# Patient Record
Sex: MALE | Race: OTHER | Employment: FULL TIME | URBAN - METROPOLITAN AREA
[De-identification: names, ages, dates, MRNs, and addresses within clinical notes are randomized per-mention and may not be internally consistent; named-entity substitution may affect disease eponyms.]

---

## 2018-11-16 ENCOUNTER — HOSPITAL ENCOUNTER (OUTPATIENT)
Age: 43
Discharge: HOME | End: 2018-11-16
Payer: COMMERCIAL

## 2018-11-16 VITALS
DIASTOLIC BLOOD PRESSURE: 97 MMHG | RESPIRATION RATE: 21 BRPM | HEART RATE: 83 BPM | SYSTOLIC BLOOD PRESSURE: 145 MMHG | TEMPERATURE: 98 F | OXYGEN SATURATION: 97 %

## 2018-11-16 DIAGNOSIS — B30.9 ACUTE VIRAL CONJUNCTIVITIS OF LEFT EYE: Primary | ICD-10-CM

## 2018-11-16 PROCEDURE — 99204 OFFICE O/P NEW MOD 45 MIN: CPT

## 2018-11-16 PROCEDURE — 99203 OFFICE O/P NEW LOW 30 MIN: CPT

## 2018-11-16 RX ORDER — OFLOXACIN 3 MG/ML
2 SOLUTION/ DROPS OPHTHALMIC 4 TIMES DAILY
Qty: 10 ML | Refills: 0 | Status: SHIPPED | OUTPATIENT
Start: 2018-11-16 | End: 2019-07-30 | Stop reason: ALTCHOICE

## 2018-11-16 RX ORDER — TETRACAINE HYDROCHLORIDE 5 MG/ML
2 SOLUTION OPHTHALMIC ONCE
Status: COMPLETED | OUTPATIENT
Start: 2018-11-16 | End: 2018-11-16

## 2018-11-16 RX ORDER — FLUTICASONE PROPIONATE 50 MCG
2 SPRAY, SUSPENSION (ML) NASAL DAILY
Qty: 16 G | Refills: 0 | Status: SHIPPED | OUTPATIENT
Start: 2018-11-16 | End: 2018-12-16

## 2018-11-16 NOTE — ED INITIAL ASSESSMENT (HPI)
Left eye- irritation since yesterday, something into the eye  He states  Its Itchy,  He noted discharge last night.

## 2018-11-16 NOTE — ED PROVIDER NOTES
Patient Seen in: [facility]    History   Patient presents with:  Eye Problem    Stated Complaint: LEFT EYE IRRITATION    HPI    Patient is a pleasant 49-year-old male.   Yesterday evening, while at work, patient experienced a rylee Physical Exam    Gen: Well appearing, well groomed, alert and aware x 3  Neck: Supple, full range of motion, no thyromegaly or lymphadenopathy. Eye examination: EOMs are intact, subtle uniform injection of the left conjunctival surface.   Active di 0    Fluticasone Propionate 50 MCG/ACT Nasal Suspension  2 sprays by Nasal route daily.   Qty: 16 g Refills: 0

## 2019-01-17 ENCOUNTER — HOSPITAL ENCOUNTER (OUTPATIENT)
Age: 44
Discharge: HOME | End: 2019-01-17
Payer: COMMERCIAL

## 2019-01-17 VITALS
OXYGEN SATURATION: 98 % | BODY MASS INDEX: 31.82 KG/M2 | HEIGHT: 66 IN | RESPIRATION RATE: 20 BRPM | WEIGHT: 198 LBS | DIASTOLIC BLOOD PRESSURE: 88 MMHG | SYSTOLIC BLOOD PRESSURE: 140 MMHG | TEMPERATURE: 98 F | HEART RATE: 92 BPM

## 2019-01-17 DIAGNOSIS — J11.1 INFLUENZA: ICD-10-CM

## 2019-01-17 DIAGNOSIS — B34.9 VIRAL SYNDROME: Primary | ICD-10-CM

## 2019-01-17 LAB — POCT RAPID STREP: NEGATIVE

## 2019-01-17 PROCEDURE — 87430 STREP A AG IA: CPT | Performed by: PHYSICIAN ASSISTANT

## 2019-01-17 PROCEDURE — 87081 CULTURE SCREEN ONLY: CPT | Performed by: PHYSICIAN ASSISTANT

## 2019-01-17 PROCEDURE — 99214 OFFICE O/P EST MOD 30 MIN: CPT

## 2019-01-17 RX ORDER — OSELTAMIVIR PHOSPHATE 75 MG/1
75 CAPSULE ORAL 2 TIMES DAILY
Qty: 10 CAPSULE | Refills: 0 | Status: SHIPPED | OUTPATIENT
Start: 2019-01-17 | End: 2019-01-22

## 2019-01-17 NOTE — ED PROVIDER NOTES
Patient Seen in: [facility]    History   Patient presents with:  Sore Throat    Stated Complaint: SORE THROAT     HPI    Dwyane People is a 37yo M who comes in with complaints of sore throat for the past 2 days along with generalized conjunctiva and cornea clear, both eyes   Ears: TM pearly gray color and semitransparent, external ear canals normal, both ears   Nose: Nares symmetrical, septum midline, mucosa normal, clear watery discharge; no sinus tenderness   Throat: Lips, tongue, an for a visit   If symptoms worsen        Medications Prescribed:  Current Discharge Medication List    START taking these medications    Oseltamivir Phosphate 75 MG Oral Cap  Take 1 capsule (75 mg total) by mouth 2 (two) times daily for 5 days.   Qty: 10 cap

## 2019-01-17 NOTE — ED INITIAL ASSESSMENT (HPI)
Patient presents with sore throat x 2 days. Did get flu shot. +Sinus congestion/pressure with drip. No fever noted.

## 2019-04-26 ENCOUNTER — HOSPITAL ENCOUNTER (OUTPATIENT)
Age: 44
Discharge: HOME | End: 2019-04-26
Attending: EMERGENCY MEDICINE
Payer: COMMERCIAL

## 2019-04-26 ENCOUNTER — APPOINTMENT (OUTPATIENT)
Dept: GENERAL RADIOLOGY | Age: 44
End: 2019-04-26
Attending: EMERGENCY MEDICINE
Payer: COMMERCIAL

## 2019-04-26 VITALS
HEART RATE: 103 BPM | TEMPERATURE: 97 F | SYSTOLIC BLOOD PRESSURE: 149 MMHG | DIASTOLIC BLOOD PRESSURE: 92 MMHG | RESPIRATION RATE: 20 BRPM

## 2019-04-26 DIAGNOSIS — M25.561 RIGHT KNEE PAIN, UNSPECIFIED CHRONICITY: Primary | ICD-10-CM

## 2019-04-26 PROCEDURE — 99213 OFFICE O/P EST LOW 20 MIN: CPT

## 2019-04-26 PROCEDURE — 73560 X-RAY EXAM OF KNEE 1 OR 2: CPT | Performed by: EMERGENCY MEDICINE

## 2019-04-26 RX ORDER — FENOFIBRATE 145 MG/1
145 TABLET, COATED ORAL DAILY
COMMUNITY

## 2019-04-26 NOTE — ED PROVIDER NOTES
Patient Seen in: [facility]    History   Patient presents with:  Knee Pain    Stated Complaint: right knee pain,no injury    HPI    49-year-old male presents emergency room with chief complaint of right knee pain for approximate Resp 20     Repeat heart rate: 88    Physical Exam    GENERAL: Patient is awake, alert, well-appearing, in no acute distress. EXTREMITIES: Pelvis stable no tenderness bilateral hips, no tense swelling to the right thigh.   No erythema swelling or warmth to

## 2019-04-26 NOTE — ED INITIAL ASSESSMENT (HPI)
Right knee- x 3 weeks . Hurts when going up and down. Denies any injury. Takes naproxen 2x per day for back pain. Denies any swelling.  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

## 2019-07-30 ENCOUNTER — HOSPITAL ENCOUNTER (OUTPATIENT)
Age: 44
Discharge: HOME | End: 2019-07-30
Payer: COMMERCIAL

## 2019-07-30 VITALS
HEART RATE: 101 BPM | SYSTOLIC BLOOD PRESSURE: 141 MMHG | TEMPERATURE: 98 F | WEIGHT: 191 LBS | BODY MASS INDEX: 30.7 KG/M2 | RESPIRATION RATE: 20 BRPM | HEIGHT: 66 IN | DIASTOLIC BLOOD PRESSURE: 88 MMHG | OXYGEN SATURATION: 97 %

## 2019-07-30 DIAGNOSIS — H10.31 ACUTE CONJUNCTIVITIS OF RIGHT EYE, UNSPECIFIED ACUTE CONJUNCTIVITIS TYPE: ICD-10-CM

## 2019-07-30 DIAGNOSIS — H57.89 IRRITATION OF RIGHT EYE: Primary | ICD-10-CM

## 2019-07-30 PROCEDURE — 99213 OFFICE O/P EST LOW 20 MIN: CPT

## 2019-07-30 PROCEDURE — 99214 OFFICE O/P EST MOD 30 MIN: CPT

## 2019-07-30 RX ORDER — TETRACAINE HYDROCHLORIDE 5 MG/ML
1 SOLUTION OPHTHALMIC ONCE
Status: COMPLETED | OUTPATIENT
Start: 2019-07-30 | End: 2019-07-30

## 2019-07-30 RX ORDER — OFLOXACIN 3 MG/ML
2 SOLUTION/ DROPS OPHTHALMIC 4 TIMES DAILY
Qty: 1 BOTTLE | Refills: 0 | Status: SHIPPED | OUTPATIENT
Start: 2019-07-30 | End: 2019-08-06

## 2019-07-30 RX ORDER — TETRACAINE HYDROCHLORIDE 5 MG/ML
1 SOLUTION OPHTHALMIC ONCE
Status: DISCONTINUED | OUTPATIENT
Start: 2019-07-30 | End: 2019-07-30

## 2019-07-30 NOTE — ED PROVIDER NOTES
Patient Seen in: [facility]    History   Patient presents with:  Irritation    Stated Complaint: right eye irritation    HPI  78-year-old male with history of anxiety, back pain, and diabetes presents stating that he feels like (Temporal)   Resp 20   Ht 167.6 cm (5' 6\")   Wt 86.6 kg   SpO2 97%   BMI 30.83 kg/m²     Right Eye Chart Acuity: 20/25, Uncorrected  Left Eye Chart Acuity: 20/25, Uncorrected    Physical Exam   Constitutional: He is oriented to person, place, and time.  He Helder Garcia    Call today  As needed        Medications Prescribed:  Discharge Medication List as of 7/30/2019  9:51 AM    START taking these medications    ofloxacin 0.3 % Ophthalmic Solution  Place 2 drops into the right eye

## 2019-07-30 NOTE — ED NOTES
Pt's rt eye was irrigated with 250 cc normal saline, he tolerated it well andd stated it feels much better

## 2019-07-30 NOTE — ED INITIAL ASSESSMENT (HPI)
Pt feels like he got something in his eye while getting into his car yesterday am. It continues to bother.  It is not draining buut feels like their might be something in it

## 2020-12-11 PROBLEM — M47.816 LUMBAR SPONDYLOSIS: Status: ACTIVE | Noted: 2020-12-11

## 2021-05-13 ENCOUNTER — HOSPITAL ENCOUNTER (OUTPATIENT)
Age: 46
Discharge: HOME | End: 2021-05-13
Payer: COMMERCIAL

## 2021-05-13 VITALS
RESPIRATION RATE: 14 BRPM | HEART RATE: 90 BPM | OXYGEN SATURATION: 98 % | WEIGHT: 198 LBS | SYSTOLIC BLOOD PRESSURE: 123 MMHG | DIASTOLIC BLOOD PRESSURE: 84 MMHG | TEMPERATURE: 98 F | HEIGHT: 66 IN | BODY MASS INDEX: 31.82 KG/M2

## 2021-05-13 DIAGNOSIS — H57.11 ACUTE RIGHT EYE PAIN: Primary | ICD-10-CM

## 2021-05-13 PROCEDURE — 99213 OFFICE O/P EST LOW 20 MIN: CPT

## 2021-05-13 RX ORDER — ERYTHROMYCIN 5 MG/G
1 OINTMENT OPHTHALMIC EVERY 6 HOURS
Qty: 1 G | Refills: 0 | Status: SHIPPED | OUTPATIENT
Start: 2021-05-13 | End: 2021-05-20

## 2021-05-13 NOTE — ED INITIAL ASSESSMENT (HPI)
Pt c/o right eye itching and feels in his eye x 2 days. Pt states when he was walking to his car x 2 days ago felt like debris got in his eye.  Pt states he has tried flushing his right eye and using lubricating eye drops but still has sensation of FB in ri

## 2021-05-13 NOTE — ED PROVIDER NOTES
Patient Seen in: [facility] [place]      History   Patient presents with:  Eye Problem    Stated Complaint: eye irritation x 2 days    HPI/Subjective:   HPI    49-year-old male presents to the immediate care for evaluation of foreign body sensati Surgeon: Natali Issa MD;  Location: [address] [place] [address]   • M-SEDAJ BY  PHYS [id number] [facility] [address]  N SV 5+ YR  4/4/2013    Procedure: LUMBAR EPIDURAL;  Surgeon: Natali Issa MD;  Location: [facility]   • M-SEDAJ BY  PHYS PERF exudate. Pupils: Pupils are equal, round, and reactive to light. Right eye: No corneal abrasion or fluorescein uptake.    Pulmonary:      Effort: Pulmonary effort is normal.   Musculoskeletal:      Cervical back: Normal range of motion and neck sup

## 2022-05-26 ENCOUNTER — APPOINTMENT (OUTPATIENT)
Dept: GENERAL RADIOLOGY | Age: 47
End: 2022-05-26
Attending: EMERGENCY MEDICINE
Payer: COMMERCIAL

## 2022-05-26 ENCOUNTER — HOSPITAL ENCOUNTER (OUTPATIENT)
Age: 47
Discharge: HOME | End: 2022-05-26
Attending: EMERGENCY MEDICINE
Payer: COMMERCIAL

## 2022-05-26 VITALS
OXYGEN SATURATION: 97 % | SYSTOLIC BLOOD PRESSURE: 132 MMHG | HEIGHT: 66 IN | RESPIRATION RATE: 18 BRPM | BODY MASS INDEX: 29.73 KG/M2 | HEART RATE: 86 BPM | TEMPERATURE: 98 F | WEIGHT: 185 LBS | DIASTOLIC BLOOD PRESSURE: 75 MMHG

## 2022-05-26 DIAGNOSIS — U07.1 COVID: Primary | ICD-10-CM

## 2022-05-26 LAB
S PYO AG THROAT QL: NEGATIVE
SARS-COV-2 RNA RESP QL NAA+PROBE: DETECTED

## 2022-05-26 PROCEDURE — 99213 OFFICE O/P EST LOW 20 MIN: CPT

## 2022-05-26 PROCEDURE — 71046 X-RAY EXAM CHEST 2 VIEWS: CPT | Performed by: EMERGENCY MEDICINE

## 2022-05-26 PROCEDURE — 87880 STREP A ASSAY W/OPTIC: CPT

## 2022-05-26 RX ORDER — SITAGLIPTIN 100 MG/1
TABLET, FILM COATED ORAL
COMMUNITY
Start: 2022-05-23

## 2022-05-26 RX ORDER — SUCRALFATE 1 G/1
TABLET ORAL
COMMUNITY

## 2022-05-26 RX ORDER — OMEPRAZOLE 40 MG/1
CAPSULE, DELAYED RELEASE ORAL
COMMUNITY

## 2022-05-26 RX ORDER — ALPRAZOLAM 0.25 MG/1
0.25 TABLET ORAL AS DIRECTED
COMMUNITY

## 2022-05-26 RX ORDER — CYCLOBENZAPRINE HCL 10 MG
TABLET ORAL
COMMUNITY
Start: 2022-05-09

## 2022-10-26 ENCOUNTER — HOSPITAL ENCOUNTER (OUTPATIENT)
Age: 47
Discharge: ANOTHER HOSPITAL | End: 2022-10-26
Attending: EMERGENCY MEDICINE
Payer: COMMERCIAL

## 2022-10-26 VITALS
WEIGHT: 190 LBS | BODY MASS INDEX: 30.53 KG/M2 | RESPIRATION RATE: 18 BRPM | OXYGEN SATURATION: 97 % | SYSTOLIC BLOOD PRESSURE: 153 MMHG | HEIGHT: 66 IN | DIASTOLIC BLOOD PRESSURE: 90 MMHG | HEART RATE: 75 BPM | TEMPERATURE: 98 F

## 2022-10-26 DIAGNOSIS — R42 DIZZINESS: ICD-10-CM

## 2022-10-26 DIAGNOSIS — I16.1 HYPERTENSIVE EMERGENCY: Primary | ICD-10-CM

## 2022-10-26 DIAGNOSIS — R51.9 NONINTRACTABLE HEADACHE, UNSPECIFIED CHRONICITY PATTERN, UNSPECIFIED HEADACHE TYPE: ICD-10-CM

## 2022-10-26 PROCEDURE — 99213 OFFICE O/P EST LOW 20 MIN: CPT

## 2022-10-26 PROCEDURE — 99215 OFFICE O/P EST HI 40 MIN: CPT

## 2022-10-26 RX ORDER — ONDANSETRON 4 MG/1
4 TABLET, ORALLY DISINTEGRATING ORAL ONCE
Status: COMPLETED | OUTPATIENT
Start: 2022-10-26 | End: 2022-10-26

## 2022-10-26 NOTE — ED INITIAL ASSESSMENT (HPI)
Patient presents to IC with c/o feeling \"dizzy\"on the way home from work today. Frontal headache. Feels like room is spinning. +Nausea. h/o vertigo

## 2022-12-09 ENCOUNTER — HOSPITAL ENCOUNTER (OUTPATIENT)
Age: 47
Discharge: HOME | End: 2022-12-09
Payer: COMMERCIAL

## 2022-12-09 VITALS
HEIGHT: 66 IN | DIASTOLIC BLOOD PRESSURE: 81 MMHG | BODY MASS INDEX: 29.25 KG/M2 | RESPIRATION RATE: 20 BRPM | SYSTOLIC BLOOD PRESSURE: 150 MMHG | OXYGEN SATURATION: 93 % | TEMPERATURE: 99 F | HEART RATE: 66 BPM | WEIGHT: 182 LBS

## 2022-12-09 DIAGNOSIS — J40 BRONCHITIS: Primary | ICD-10-CM

## 2022-12-09 PROCEDURE — 99213 OFFICE O/P EST LOW 20 MIN: CPT

## 2022-12-09 RX ORDER — CODEINE PHOSPHATE AND GUAIFENESIN 10; 100 MG/5ML; MG/5ML
5 SOLUTION ORAL EVERY 6 HOURS PRN
Qty: 180 ML | Refills: 0 | Status: SHIPPED | OUTPATIENT
Start: 2022-12-09

## 2022-12-09 RX ORDER — ALBUTEROL SULFATE 90 UG/1
2 AEROSOL, METERED RESPIRATORY (INHALATION) EVERY 4 HOURS PRN
Qty: 18 G | Refills: 0 | Status: SHIPPED | OUTPATIENT
Start: 2022-12-09

## 2022-12-09 NOTE — DISCHARGE INSTRUCTIONS
1. Drink plenty fluids  2. During the day, you may use Mucinex DM one tab every 12 hours    3. Also consider using inhaler every 4 hours   4. Follow-up with your family doctor in 2-3 days if no better  5. Return to ED for any worsening or persisting symptoms, shortness of breath, chest pain, dizziness, fever.

## 2023-03-16 ENCOUNTER — HOSPITAL ENCOUNTER (OUTPATIENT)
Age: 48
Discharge: HOME | End: 2023-03-16
Payer: COMMERCIAL

## 2023-03-16 VITALS
RESPIRATION RATE: 16 BRPM | WEIGHT: 190 LBS | HEIGHT: 66 IN | TEMPERATURE: 98 F | DIASTOLIC BLOOD PRESSURE: 78 MMHG | BODY MASS INDEX: 30.53 KG/M2 | HEART RATE: 84 BPM | OXYGEN SATURATION: 97 % | SYSTOLIC BLOOD PRESSURE: 132 MMHG

## 2023-03-16 DIAGNOSIS — J40 BRONCHITIS: Primary | ICD-10-CM

## 2023-03-16 PROCEDURE — 99213 OFFICE O/P EST LOW 20 MIN: CPT

## 2023-03-16 PROCEDURE — 99214 OFFICE O/P EST MOD 30 MIN: CPT

## 2023-03-16 RX ORDER — CODEINE PHOSPHATE AND GUAIFENESIN 10; 100 MG/5ML; MG/5ML
5 SOLUTION ORAL EVERY 6 HOURS PRN
Qty: 180 ML | Refills: 0 | Status: SHIPPED | OUTPATIENT
Start: 2023-03-16

## 2023-03-16 RX ORDER — AZITHROMYCIN 250 MG/1
TABLET, FILM COATED ORAL
Qty: 6 TABLET | Refills: 0 | Status: SHIPPED | OUTPATIENT
Start: 2023-03-16

## 2023-03-16 RX ORDER — ALBUTEROL SULFATE 90 UG/1
2 AEROSOL, METERED RESPIRATORY (INHALATION) EVERY 4 HOURS PRN
Qty: 18 G | Refills: 0 | Status: SHIPPED | OUTPATIENT
Start: 2023-03-16

## 2023-03-16 RX ORDER — PREDNISONE 20 MG/1
40 TABLET ORAL DAILY
Qty: 10 TABLET | Refills: 0 | Status: SHIPPED | OUTPATIENT
Start: 2023-03-16 | End: 2023-03-21

## 2023-03-16 NOTE — DISCHARGE INSTRUCTIONS
Drink plenty fluids  During the day, you may use Mucinex DM one tab every 12 hours    Also consider using inhaler every 4 hours   Take steroids as prescribed. Follow-up with your family doctor in 2-3 days if no better  Return to ED for any worsening or persisting symptoms, shortness of breath, chest pain, dizziness, fever. You have been given a paper prescription for Z-Bart, if you are not improving in the next 7 to 10 days you may start that prior to your travel.

## 2023-12-27 ENCOUNTER — HOSPITAL ENCOUNTER (OUTPATIENT)
Age: 48
Discharge: HOME | End: 2023-12-27
Payer: COMMERCIAL

## 2023-12-27 VITALS
OXYGEN SATURATION: 100 % | RESPIRATION RATE: 18 BRPM | BODY MASS INDEX: 30 KG/M2 | HEART RATE: 102 BPM | TEMPERATURE: 99 F | DIASTOLIC BLOOD PRESSURE: 89 MMHG | SYSTOLIC BLOOD PRESSURE: 127 MMHG | WEIGHT: 186 LBS

## 2023-12-27 DIAGNOSIS — B34.9 VIRAL ILLNESS: Primary | ICD-10-CM

## 2023-12-27 LAB
POCT INFLUENZA A: NEGATIVE
POCT INFLUENZA B: NEGATIVE
S PYO AG THROAT QL IA.RAPID: NEGATIVE
SARS-COV-2 RNA RESP QL NAA+PROBE: NOT DETECTED

## 2023-12-27 PROCEDURE — 99213 OFFICE O/P EST LOW 20 MIN: CPT

## 2023-12-27 PROCEDURE — 87502 INFLUENZA DNA AMP PROBE: CPT | Performed by: NURSE PRACTITIONER

## 2023-12-27 PROCEDURE — 99214 OFFICE O/P EST MOD 30 MIN: CPT

## 2023-12-27 PROCEDURE — 87651 STREP A DNA AMP PROBE: CPT | Performed by: NURSE PRACTITIONER

## 2023-12-27 RX ORDER — BENZONATATE 100 MG/1
100 CAPSULE ORAL 3 TIMES DAILY PRN
Qty: 30 CAPSULE | Refills: 0 | Status: SHIPPED | OUTPATIENT
Start: 2023-12-27 | End: 2024-01-26

## 2023-12-27 RX ORDER — CODEINE PHOSPHATE/GUAIFENESIN 10-100MG/5
5 LIQUID (ML) ORAL EVERY 8 HOURS PRN
Qty: 118 ML | Refills: 0 | Status: SHIPPED | OUTPATIENT
Start: 2023-12-27 | End: 2024-01-10

## 2023-12-27 NOTE — DISCHARGE INSTRUCTIONS
Your COVID test, influenza test, strep test are all negative today. It is likely you have another viral illness. Benzonatate cough pills as directed if needed. Guaifenesin codeine cough medication as directed if needed. Narcotic cough medications may cause drowsiness. Do not drive or operate machinery while taking these medications. Exercise caution if you have sleep apnea. Narcotics may lower your respiration rate and cause you to stop breathing. Narcotics can become habit-forming. Narcotic medications may also cause constipation. You may want to take a stool softener while taking narcotics. -Drink plenty of fluids. Use a humidifier.   -Get plenty of rest.   Acetaminophen if needed for fever or body aches. Only take this medication if you are not allergic to it or have no other contraindications to taking this medicine. You may take an over the counter cough medicine that contains Dextromethorphan (DM), Mucinex (guaifenesin), and an antihistamine. You should also suck on cough drops. Drink hot tea with honey to help control your cough. Topical Vicks VapoRub to your chest    Use a humidifier or inhale steam from a shower to increase air moisture. This may make it easier to breathe. Drink enough fluid to keep your urine clear or pale yellow. Rest as needed. Seek immediate medical care if your symptoms are persistent or worsening: spiking fevers, chest pain, shortness of breath, weakness, fatigue, worsening cough, feeling dehydrated.

## 2024-04-01 ENCOUNTER — TRANSFERRED RECORDS (OUTPATIENT)
Dept: HEALTH INFORMATION MANAGEMENT | Facility: CLINIC | Age: 49
End: 2024-04-01

## 2024-04-01 LAB
ALT SERPL-CCNC: 27 U/L (ref 9–46)
AST SERPL-CCNC: 20 U/L (ref 10–35)
C TRACH DNA SPEC QL PROBE+SIG AMP: NOT DETECTED
CHOLESTEROL (EXTERNAL): 172 MG/DL
CREATININE (EXTERNAL): 0.57 MG/DL (ref 0.7–1.3)
GFR ESTIMATED (EXTERNAL): 119 ML/MIN/1.73M2
GLUCOSE (EXTERNAL): 121 MG/DL (ref 65–99)
HBA1C MFR BLD: 6 %
HDLC SERPL-MCNC: 45 MG/DL
HEP C HIM: NORMAL
HIV 1&2 EXT: NORMAL
LDL CHOLESTEROL CALCULATED (EXTERNAL): 93 MG/DL(CALC)
N GONORRHOEA DNA SPEC QL PROBE+SIG AMP: NOT DETECTED
NON HDL CHOLESTEROL (EXTERNAL): 127 MG/DL(CALC)
PHQ9 SCORE: 3
POTASSIUM (EXTERNAL): 4.2 MMOL/L (ref 3.5–5.3)
SPECIMEN DESCRIP: NORMAL
SPECIMEN DESCRIPTION: NORMAL
TRIGLYCERIDES (EXTERNAL): 245 MG/DL
TSH SERPL-ACNC: 1.63 MIU/L (ref 0.4–4.5)

## 2024-04-09 ENCOUNTER — MEDICAL CORRESPONDENCE (OUTPATIENT)
Dept: HEALTH INFORMATION MANAGEMENT | Facility: CLINIC | Age: 49
End: 2024-04-09

## 2024-04-09 ENCOUNTER — TRANSCRIBE ORDERS (OUTPATIENT)
Dept: OTHER | Age: 49
End: 2024-04-09

## 2024-04-09 DIAGNOSIS — Q38.3 TONGUE ANOMALY: ICD-10-CM

## 2024-04-09 DIAGNOSIS — R13.10 DYSPHAGIA: Primary | ICD-10-CM

## 2024-04-09 DIAGNOSIS — D18.00 HEMANGIOMA: ICD-10-CM

## 2024-04-10 ENCOUNTER — TELEPHONE (OUTPATIENT)
Dept: SURGERY | Facility: CLINIC | Age: 49
End: 2024-04-10

## 2024-04-10 NOTE — TELEPHONE ENCOUNTER
This encounter is being sent to inform the clinic that this patient has a referral from Josefina Mcghee  for the diagnoses of R13.10 (ICD-10-CM) - Dysphagia   Q38.3 (ICD-10-CM) - Tongue anomaly   D18.00 (ICD-10-CM) - Hemangioma  and has requested that this patient be seen within 1-2 weeks.  Based on the availability of our provider(s), we are unable to accommodate this request.    Were all sites offered this patient?  [facility], Referral requesting Dr Nicole, dx not in protocols, is this something she will see for?    Dr. Ramandeep Nicole at [facility]     hemangioma of the mouth, cheeks and tongue     Patient does not have medical insurance    Does scheduling algorithm request to schedule next available?  Patient appointment has not been scheduled.  Please review the referral request for accommodation and contact the patient.  If unable to accommodate, please resubmit a referral and indicate a preferred partner or affiliate location using Provider Finder or Scheduling Instructions field.

## 2024-04-11 ENCOUNTER — APPOINTMENT (OUTPATIENT)
Dept: INTERPRETER SERVICES | Facility: CLINIC | Age: 49
End: 2024-04-11

## 2024-04-11 NOTE — TELEPHONE ENCOUNTER
Patient confirmed scheduled appointment:  Date: 5/6  Time: 11  Provider: Vibha  Location:    Testing/imaging: =  Additional notes: .

## 2024-04-30 NOTE — PROGRESS NOTES
History of Present Illness - Rodrigo Garcia Reyes is a very pleasant 49 year old male here to see me for the first time due to swallowing problems.    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  He has had recurrent resections of the tongue as well.  He describes the dysphagia as having to struggle to push the bolus of food down into the throat.  He can still tolerate regular PO diet, but it is getting more difficult.    His hemangioma management started in [place].  His most recent surgery was the [facility] in 2010.    Past Medical History - There is no problem list on file for this patient.      Current Medications - No current outpatient medications on file.    Allergies - Not on File    Social History -   Social History     Socioeconomic History    Marital status: Patient Declined       Family History - No family history on file.    Review of Systems - As per HPI and PMHx, otherwise 10+ system review of the head and neck, and general constitution is negative.    /71   Pulse 66   Resp 16   Wt 83.5 kg (184 lb)   SpO2 96%       General - The patient is well nourished and well developed, and appears to have good nutritional status.  Alert and oriented to person and place, answers questions and cooperates with examination appropriately.   Head and Face - the contour of the right lower face and lower RIGHT lip are heavily scarrred and distorted.  Voice and Breathing - The patient was breathing comfortably without the use of accessory muscles. There was no wheezing, stridor, or stertor.  The patients voice was clear and strong, and had appropriate pitch and quality.  Ears - The tympanic membranes are normal in appearance, bony landmarks are intact.  No retraction, perforation, or masses.  No fluid or purulence was seen in the external canal or the middle ear. No evidence of infection of the middle ear or external canal, cerumen was normal in appearance.  Eyes -  Extraocular movements intact, and the pupils were reactive to light.  Sclera were not icteric or injected, conjunctiva were pink and moist.  Mouth - Examination of the oral cavity showed a very misshapen and enlarged violaceous tongue.  Throat - The walls of the oropharynx were smooth, pink, moist, symmetric, and had no lesions or ulcerations.  The tonsillar pillars and soft palate were symmetric.  The uvula was midline on elevation.    Neck - Normal midline excursion of the laryngotracheal complex during swallowing.  Full range of motion on passive movement.  Palpation of the occipital, submental, submandibular, internal jugular chain, and supraclavicular nodes did not demonstrate any abnormal lymph nodes or masses.  The carotid pulse was palpable bilaterally.  Palpation of the thyroid was soft and smooth, with no nodules or goiter appreciated.  The trachea was mobile and midline.        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  I will refer to Head and neck at the  for treatment options.    I have referred to Speech for a clinical swallow evaluation     And finally, in preparation for referral to head and neck, I have ordered an MRI of the neck soft tissues.

## 2024-05-06 ENCOUNTER — OFFICE VISIT (OUTPATIENT)
Dept: OTOLARYNGOLOGY | Facility: CLINIC | Age: 49
End: 2024-05-06

## 2024-05-06 VITALS
OXYGEN SATURATION: 96 % | SYSTOLIC BLOOD PRESSURE: 111 MMHG | RESPIRATION RATE: 16 BRPM | HEART RATE: 66 BPM | WEIGHT: 184 LBS | DIASTOLIC BLOOD PRESSURE: 71 MMHG

## 2024-05-06 DIAGNOSIS — D18.00: Primary | ICD-10-CM

## 2024-05-06 DIAGNOSIS — Q38.3 TONGUE ANOMALY: ICD-10-CM

## 2024-05-06 PROCEDURE — 99204 OFFICE O/P NEW MOD 45 MIN: CPT | Performed by: OTOLARYNGOLOGY

## 2024-05-06 RX ORDER — MELOXICAM 15 MG/1
TABLET ORAL
COMMUNITY
Start: 2024-03-27

## 2024-05-06 RX ORDER — PSEUDOEPHED/ACETAMINOPH/DIPHEN 30MG-500MG
TABLET ORAL
COMMUNITY
Start: 2024-03-27

## 2024-05-06 RX ORDER — PROPRANOLOL HYDROCHLORIDE 20 MG/1
TABLET ORAL
COMMUNITY
Start: 2024-04-02

## 2024-05-06 ASSESSMENT — PAIN SCALES - GENERAL: PAINLEVEL: NO PAIN (0)

## 2024-05-06 NOTE — LETTER
5/6/2024         RE: Rodrigo Garcia Reyes  [address]  [facility] [place] [address]        Dear Colleague,    Thank you for referring your patient, Rodrigo Garcia Reyes, to the [facility]. Please see a copy of my visit note below.    History of Present Illness - Rodrigo Garcia Reyes is a very pleasant 49 year old male here to see me for the first time due to swallowing problems.    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  He has had recurrent resections of the tongue as well.  He describes the dysphagia as having to struggle to push the bolus of food down into the throat.  He can still tolerate regular PO diet, but it is getting more difficult.    His hemangioma management started in [place].  His most recent surgery was [facility] in 2010.    Past Medical History - There is no problem list on file for this patient.      Current Medications - No current outpatient medications on file.    Allergies - Not on File    Social History -   Social History     Socioeconomic History     Marital status: Patient Declined       Family History - No family history on file.    Review of Systems - As per HPI and PMHx, otherwise 10+ system review of the head and neck, and general constitution is negative.    /71   Pulse 66   Resp 16   Wt 83.5 kg (184 lb)   SpO2 96%       General - The patient is well nourished and well developed, and appears to have good nutritional status.  Alert and oriented to person and place, answers questions and cooperates with examination appropriately.   Head and Face - the contour of the right lower face and lower RIGHT lip are heavily scarrred and distorted.  Voice and Breathing - The patient was breathing comfortably without the use of accessory muscles. There was no wheezing, stridor, or stertor.  The patients voice was clear and strong, and had appropriate pitch and quality.  Ears - The tympanic membranes are  normal in appearance, bony landmarks are intact.  No retraction, perforation, or masses.  No fluid or purulence was seen in the external canal or the middle ear. No evidence of infection of the middle ear or external canal, cerumen was normal in appearance.  Eyes - Extraocular movements intact, and the pupils were reactive to light.  Sclera were not icteric or injected, conjunctiva were pink and moist.  Mouth - Examination of the oral cavity showed a very misshapen and enlarged violaceous tongue.  Throat - The walls of the oropharynx were smooth, pink, moist, symmetric, and had no lesions or ulcerations.  The tonsillar pillars and soft palate were symmetric.  The uvula was midline on elevation.    Neck - Normal midline excursion of the laryngotracheal complex during swallowing.  Full range of motion on passive movement.  Palpation of the occipital, submental, submandibular, internal jugular chain, and supraclavicular nodes did not demonstrate any abnormal lymph nodes or masses.  The carotid pulse was palpable bilaterally.  Palpation of the thyroid was soft and smooth, with no nodules or goiter appreciated.  The trachea was mobile and midline.        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  I will refer to Head and neck at the  for treatment options.    I have referred to Speech for a clinical swallow evaluation     And finally, in preparation for referral to head and neck, I have ordered an MRI of the neck soft tissues.      Again, thank you for allowing me to participate in the care of your patient.        Sincerely,        Carlyle Dunne MD

## 2024-12-19 ENCOUNTER — HOSPITAL ENCOUNTER (OUTPATIENT)
Age: 49
Discharge: HOME | End: 2024-12-19
Payer: COMMERCIAL

## 2024-12-19 ENCOUNTER — APPOINTMENT (OUTPATIENT)
Dept: GENERAL RADIOLOGY | Age: 49
End: 2024-12-19
Attending: PHYSICIAN ASSISTANT
Payer: COMMERCIAL

## 2024-12-19 VITALS
SYSTOLIC BLOOD PRESSURE: 156 MMHG | OXYGEN SATURATION: 97 % | DIASTOLIC BLOOD PRESSURE: 86 MMHG | BODY MASS INDEX: 28.93 KG/M2 | RESPIRATION RATE: 22 BRPM | HEART RATE: 90 BPM | HEIGHT: 66 IN | WEIGHT: 180 LBS | TEMPERATURE: 99 F

## 2024-12-19 DIAGNOSIS — J18.9 COMMUNITY ACQUIRED PNEUMONIA OF LEFT LOWER LOBE OF LUNG: Primary | ICD-10-CM

## 2024-12-19 PROBLEM — G45.9 TRANSIENT ISCHEMIC ATTACK: Status: ACTIVE | Noted: 2018-01-01

## 2024-12-19 PROBLEM — I21.4 ACUTE NON-ST ELEVATION MYOCARDIAL INFARCTION (NSTEMI) (HCC): Status: ACTIVE | Noted: 2023-08-29

## 2024-12-19 PROBLEM — E78.5 DYSLIPIDEMIA: Status: ACTIVE | Noted: 2023-11-09

## 2024-12-19 PROBLEM — I10 ESSENTIAL HYPERTENSION: Status: ACTIVE | Noted: 2023-11-09

## 2024-12-19 PROBLEM — F32.A DEPRESSION: Status: ACTIVE | Noted: 2023-11-09

## 2024-12-19 PROBLEM — E11.9 DIABETES MELLITUS (HCC): Status: ACTIVE | Noted: 2023-11-09

## 2024-12-19 PROBLEM — I25.10 ARTERIOSCLEROSIS OF CORONARY ARTERY: Status: ACTIVE | Noted: 2023-08-29

## 2024-12-19 LAB
POCT INFLUENZA A: NEGATIVE
POCT INFLUENZA B: NEGATIVE
SARS-COV-2 RNA RESP QL NAA+PROBE: NOT DETECTED

## 2024-12-19 PROCEDURE — 99214 OFFICE O/P EST MOD 30 MIN: CPT

## 2024-12-19 PROCEDURE — 87502 INFLUENZA DNA AMP PROBE: CPT | Performed by: PHYSICIAN ASSISTANT

## 2024-12-19 PROCEDURE — 99213 OFFICE O/P EST LOW 20 MIN: CPT

## 2024-12-19 PROCEDURE — 71046 X-RAY EXAM CHEST 2 VIEWS: CPT | Performed by: PHYSICIAN ASSISTANT

## 2024-12-19 RX ORDER — BENZONATATE 100 MG/1
CAPSULE ORAL 3 TIMES DAILY PRN
Qty: 30 CAPSULE | Refills: 0 | Status: SHIPPED | OUTPATIENT
Start: 2024-12-19 | End: 2025-01-18

## 2024-12-19 RX ORDER — AZITHROMYCIN 250 MG/1
TABLET, FILM COATED ORAL
Qty: 6 TABLET | Refills: 0 | Status: SHIPPED | OUTPATIENT
Start: 2024-12-19 | End: 2024-12-24

## 2024-12-19 NOTE — ED INITIAL ASSESSMENT (HPI)
C/o dry cough for a week, throat itchy. Home kit Covid test negative. Taking Mucinex no resolution, unable to sleep good at night. No fever.

## 2024-12-19 NOTE — ED PROVIDER NOTES
Patient Seen in: [facility]      History     Chief Complaint   Patient presents with    Cough     Stated Complaint: Cough    Subjective:   HPI      CHIEF COMPLAINT: Cough     HISTORY OF PRESENT ILLNESS: Patient is a 49-year-old male with diabetes presenting for evaluation of cough.  States symptoms have been present for over a week.  Cough does not seem to be getting better.  Throat hurts when he coughs.  He reports that he is using Mucinex without relief.  Minimal sinus symptoms.  No fever or chills.  No sick contacts at home.     REVIEW OF SYSTEMS:  Constitutional: no fever, no chills  Eyes: no discharge  ENT: As above  Cardiovascular: no chest pain, no palpitations  Respiratory: As above  Gastrointestinal: no abdominal pain, no vomiting  Genitourinary: no hematuria  Musculoskeletal: no back pain  Skin: no rashes  Neurological: no headache     Otherwise a complete review of systems was obtained and other than the HPI was negative     The patient's medication list, past medical history and social history elements is as listed in today's nurse's notes are reviewed and agree. The patient's family history is reviewed and is noncontributory to the presenting problem, except as indicated as above.    Objective:     Past Medical History:    Anxiety state, unspecified    BACK PAIN    Depression    Diabetes (HCC)    Other and unspecified hyperlipidemia              Past Surgical History:   Procedure Laterality Date    Fluor gid & loclzj ndl/cath spi dx/ther njx  3/8/2013    Procedure: LUMBAR EPIDURAL;  Surgeon: Carlos Manuel Mann MD;  Location: [facility]    Fluor gid & loclzj ndl/cath spi dx/ther njx  4/4/2013    Procedure: LUMBAR EPIDURAL;  Surgeon: Carlos Manuel Mann MD;  Location: [facility]    Fluor gid & loclzj ndl/cath spi dx/ther njx  4/25/2013    Procedure: LUMBAR EPIDURAL;  Surgeon: Carlos Manuel Mann MD;  Location: [facility]     Injection, w/wo contrast, dx/therapeutic substance, epidural/subarachnoid; lumbar/sacral  3/8/2013    Procedure: LUMBAR EPIDURAL;  Surgeon: Carlos Manuel Mann MD;  Location: [facility]    Injection, w/wo contrast, dx/therapeutic substance, epidural/subarachnoid; lumbar/sacral  4/4/2013    Procedure: LUMBAR EPIDURAL;  Surgeon: Carlos Manuel Mann MD;  Location: [facility]    Injection, w/wo contrast, dx/therapeutic substance, epidural/subarachnoid; lumbar/sacral  4/25/2013    Procedure: LUMBAR EPIDURAL;  Surgeon: Carlos Manuel Mann MD;  Location: [facility]    M-sedaj by  phys perfrmg svc 5+ yr  3/8/2013    Procedure: LUMBAR EPIDURAL;  Surgeon: Carlos Manuel Mann MD;  Location: [facility]    M-sedaj by  phys perfrmg svc 5+ yr  4/4/2013    Procedure: LUMBAR EPIDURAL;  Surgeon: Carlos Manuel Mann MD;  Location: [facility]    M-sedaj by  phys perfrmg svc 5+ yr  4/25/2013    Procedure: LUMBAR EPIDURAL;  Surgeon: Carlos Manuel Mann MD;  Location: [facility]    Other surgical history  8/2011    right rotator cuff repair                Social History     Socioeconomic History    Marital status:    Tobacco Use    Smoking status: Former     Types: Cigarettes    Smokeless tobacco: Never   Vaping Use    Vaping status: Never Used   Substance and Sexual Activity    Alcohol use: Yes     Comment: occasional    Drug use: No              Review of Systems    Positive for stated complaint: Cough  Other systems are as noted in HPI.  Constitutional and vital signs reviewed.      All other systems reviewed and negative except as noted above.    Physical Exam     ED Triage Vitals [12/19/24 0813]   /86   Pulse 90   Resp 22   Temp 98.6 °F (37 °C)   Temp src Oral   SpO2 97 %   O2 Device None (Room air)       Current Vitals:   Vital Signs  BP: 156/86  Pulse: 90  Resp: 22  Temp: 98.6 °F (37 °C)  Temp src:  Oral    Oxygen Therapy  SpO2: 97 %  O2 Device: None (Room air)        Physical Exam  Vital signs and nursing notes reviewed  General Appearance: Patient is alert and oriented x4 in no acute distress  Eyes: pupils equal and round, reactive to light. No pallor or injection, no sclera icterus  Ears: Bilateral TMs and canals clear  Throat: There is no erythema or exudates, no tonsillar hypertrophy.  no trismus or stridor. Uvula midline. No phonation changes, patient handling secretions well. Mucous membranes moist.  Respiratory: there are no retractions, lungs are clear to auscultation  Cardiovascular: regular rate and rhythm  Neurological:  Grossly intact, no deficits.  Gait normal.  Skin:  warm and dry, no rashes.  No jaundice. Brisk capillary refill  Musculoskeletal: neck is supple, no lymphadenopathy, non tender. no meningeal signs.  Extremities are symmetrical, full range of motion  Psychiatric: calm and cooperative      ED Course     Labs Reviewed   POCT FLU TEST - Normal    Narrative:     This assay is a rapid molecular in vitro test utilizing nucleic acid amplification of influenza A and B viral RNA.   RAPID SARS-COV-2 BY PCR - Normal     Differential diagnosis viral URI such as COVID or flu, bronchitis, pneumonia       XR CHEST PA + LAT CHEST (CPT=71046)    Result Date: 12/19/2024  PROCEDURE:  XR CHEST PA + LAT CHEST (CPT=71046)  INDICATIONS:  Cough  COMPARISON:  None.  TECHNIQUE:  PA and lateral chest radiographs were obtained.  PATIENT STATED HISTORY: (As transcribed by Technologist)  Cough x 1 week.  No fever.  Home covid test neg.    FINDINGS:  LUNGS:  Slight infiltrate versus atelectasis in the left lung base. CARDIAC:  Normal size cardiac silhouette. MEDIASTINUM:  Normal. PLEURA:  Normal.  No pleural effusions. BONES:  Normal for age.            CONCLUSION:  Slight infiltrate/atelectasis left lung base.  Pneumonia cannot be excluded.  LOCATION:  [facility]   Dictated by (CST): Freida Johnson MD on 12/19/2024  at 8:57 AM     Finalized by (CST): Freida Johnson MD on 12/19/2024 at 8:57 AM        I personally reviewed the x-ray images.  Small infiltrate in the left lower lung.     MDM      This is a well-appearing 49-year-old male presenting for evaluation of cough for over a week.  SARS-CoV-2 testing negative, influenza testing negative.  Chest x-ray shows a left lower lobe pneumonia.  Will treat with azithromycin and Augmentin.  Tessalon Perles for cough management.  Close follow-up with primary care.  If there are any new, changing or worsening symptoms go to the ER.  Patient voiced understanding to the treatment plan.  All questions answered        Medical Decision Making  Amount and/or Complexity of Data Reviewed  Labs: ordered. Decision-making details documented in ED Course.  Radiology: ordered and independent interpretation performed. Decision-making details documented in ED Course.    Risk  Prescription drug management.        Disposition and Plan     Clinical Impression:  1. Community acquired pneumonia of left lower lobe of lung         Disposition:  Discharge  12/19/2024  9:05 am    Follow-up:  Kyle Herbert MD  [address] [place] [id number]  [phone]    In 1 week  for a recheck          Medications Prescribed:  Discharge Medication List as of 12/19/2024  9:15 AM        START taking these medications    Details   azithromycin (ZITHROMAX Z-RUDY) 250 MG Oral Tab 500 mg once followed by 250 mg daily x 4 days, Normal, Disp-6 tablet, R-0      amoxicillin clavulanate 875-125 MG Oral Tab Take 1 tablet by mouth 2 (two) times daily for 7 days., Normal, Disp-14 tablet, R-0      benzonatate 100 MG Oral Cap Take 1-2 capsules (100-200 mg total) by mouth 3 (three) times daily as needed., Normal, Disp-30 capsule, R-0                 Supplementary Documentation:

## 2024-12-19 NOTE — DISCHARGE INSTRUCTIONS
Take the antibiotics as directed.  Dontrell Zelaya for cough management.  Follow with your primary care provider.    If there are any new, changing or worsening symptoms go to the ER.  Patient voiced understanding to the treatment plan.  All questions answered

## 2025-05-07 ENCOUNTER — HOSPITAL ENCOUNTER (OUTPATIENT)
Age: 50
Discharge: HOME | End: 2025-05-07
Payer: COMMERCIAL

## 2025-05-07 VITALS
SYSTOLIC BLOOD PRESSURE: 145 MMHG | DIASTOLIC BLOOD PRESSURE: 86 MMHG | TEMPERATURE: 98 F | WEIGHT: 182 LBS | BODY MASS INDEX: 29.25 KG/M2 | HEART RATE: 88 BPM | RESPIRATION RATE: 18 BRPM | HEIGHT: 66 IN | OXYGEN SATURATION: 98 %

## 2025-05-07 DIAGNOSIS — J30.9 ALLERGIC RHINITIS, UNSPECIFIED SEASONALITY, UNSPECIFIED TRIGGER: ICD-10-CM

## 2025-05-07 DIAGNOSIS — R09.82 PND (POST-NASAL DRIP): Primary | ICD-10-CM

## 2025-05-07 DIAGNOSIS — B34.9 VIRAL ILLNESS: ICD-10-CM

## 2025-05-07 DIAGNOSIS — H10.33 ACUTE CONJUNCTIVITIS OF BOTH EYES, UNSPECIFIED ACUTE CONJUNCTIVITIS TYPE: ICD-10-CM

## 2025-05-07 PROCEDURE — 99213 OFFICE O/P EST LOW 20 MIN: CPT

## 2025-05-07 PROCEDURE — 87502 INFLUENZA DNA AMP PROBE: CPT | Performed by: NURSE PRACTITIONER

## 2025-05-07 PROCEDURE — 99214 OFFICE O/P EST MOD 30 MIN: CPT

## 2025-05-07 PROCEDURE — 87651 STREP A DNA AMP PROBE: CPT | Performed by: NURSE PRACTITIONER

## 2025-05-07 RX ORDER — POLYMYXIN B SULFATE AND TRIMETHOPRIM 1; 10000 MG/ML; [USP'U]/ML
1 SOLUTION OPHTHALMIC
Qty: 10 ML | Refills: 0 | Status: SHIPPED | OUTPATIENT
Start: 2025-05-07 | End: 2025-05-12

## 2025-05-07 RX ORDER — METHYLPREDNISOLONE 4 MG/1
TABLET ORAL
Qty: 1 EACH | Refills: 0 | Status: SHIPPED | OUTPATIENT
Start: 2025-05-07

## 2025-05-07 NOTE — DISCHARGE INSTRUCTIONS
Increase water intake 2-3 liters daily   Your dose of acetaminophen (Tylenol) is 1000 mg every 4 hours for pain or fevers as needed.     Replace your toothbrush

## 2025-05-07 NOTE — ED PROVIDER NOTES
Patient Seen in: [facility]      History     Chief Complaint   Patient presents with    Sore Throat     Stated Complaint: Watery Eyes; Sore Throat; Headache    Subjective:   HPI    50-year-old male with history of diabetes, hypertension, dyslipidemia presents today with complaints of watery eyes, sore throat and headache for 4 days.  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  Patient denies any known COVID or flu exposure.  Patient states it feels like he is standing amongst a bunch of smokers and this is how his sinuses and eyes feel.  History of Present Illness               Objective:     Past Medical History:    Anxiety state, unspecified    BACK PAIN    Depression    Diabetes (HCC)    Other and unspecified hyperlipidemia              Past Surgical History:   Procedure Laterality Date    Fluor gid & loclzj ndl/cath spi dx/ther njx  3/8/2013    Procedure: LUMBAR EPIDURAL;  Surgeon: Carlos Manuel Mann MD;  Location: [facility]    Fluor gid & loclzj ndl/cath spi dx/ther njx  4/4/2013    Procedure: LUMBAR EPIDURAL;  Surgeon: Carlos Manuel Mann MD;  Location: [facility]    Fluor gid & loclzj ndl/cath spi dx/ther njx  4/25/2013    Procedure: LUMBAR EPIDURAL;  Surgeon: Carlos Manuel Mann MD;  Location: [facility]    Injection, w/wo contrast, dx/therapeutic substance, epidural/subarachnoid; lumbar/sacral  3/8/2013    Procedure: LUMBAR EPIDURAL;  Surgeon: Carlos Manuel Mann MD;  Location: [facility]    Injection, w/wo contrast, dx/therapeutic substance, epidural/subarachnoid; lumbar/sacral  4/4/2013    Procedure: LUMBAR EPIDURAL;  Surgeon: Carlos Manuel Mann MD;  Location: [facility]    Injection, w/wo contrast, dx/therapeutic substance, epidural/subarachnoid; lumbar/sacral  4/25/2013    Procedure: LUMBAR EPIDURAL;  Surgeon: Carlos Manuel Mann MD;  Location: [facility]    M-sedaj by  phys perfrmg svc 5+ yr  3/8/2013    Procedure: LUMBAR EPIDURAL;  Surgeon: Carlos Manuel Mann MD;  Location: [facility]    M-sedaj by  phys perfrmg svc 5+ yr  4/4/2013    Procedure: LUMBAR EPIDURAL;  Surgeon: Carlos Manuel Mann MD;  Location: [facility]    M-sedaj by  phys perfrmg svc 5+ yr  4/25/2013    Procedure: LUMBAR EPIDURAL;  Surgeon: Carlos Manuel Mann MD;  Location: [facility]    Other surgical history  8/2011    right rotator cuff repair                Social History     Socioeconomic History    Marital status:    Tobacco Use    Smoking status: Former     Types: Cigarettes    Smokeless tobacco: Never   Vaping Use    Vaping status: Never Used   Substance and Sexual Activity    Alcohol use: Yes     Comment: occasional    Drug use: No              Review of Systems   Constitutional: Negative.    HENT:  Positive for congestion, postnasal drip and sore throat.    Eyes:  Positive for discharge and redness.   Respiratory: Negative.     Cardiovascular: Negative.    Gastrointestinal: Negative.    Endocrine: Negative.    Genitourinary: Negative.    Musculoskeletal: Negative.    Skin: Negative.    Allergic/Immunologic: Negative.    Neurological: Negative.    Hematological: Negative.    Psychiatric/Behavioral: Negative.         Positive for stated complaint: Watery Eyes; Sore Throat; Headache  Other systems are as noted in HPI.  Constitutional and vital signs reviewed.      All other systems reviewed and negative except as noted above.                  Physical Exam     ED Triage Vitals [05/07/25 0836]   /86   Pulse 88   Resp 18   Temp 98.3 °F (36.8 °C)   Temp src Oral   SpO2 98 %   O2 Device None (Room air)       Current Vitals:   Vital Signs  BP: 145/86  Pulse: 88  Resp: 18  Temp: 98.3 °F (36.8 °C)  Temp src: Oral    Oxygen Therapy  SpO2: 98 %  O2 Device: None (Room air)        Physical Exam  Vitals and nursing note  reviewed.   Constitutional:       Appearance: He is well-developed and normal weight.   HENT:      Head: Normocephalic.      Right Ear: Tympanic membrane and ear canal normal.      Left Ear: Tympanic membrane and ear canal normal.      Mouth/Throat:      Mouth: Mucous membranes are moist.      Pharynx: Oropharynx is clear. Posterior oropharyngeal erythema present.      Tonsils: No tonsillar exudate or tonsillar abscesses. 0 on the right. 0 on the left.   Eyes:      Comments: Bilateral conjunctiva slightly pink with clear discharge.   Cardiovascular:      Rate and Rhythm: Normal rate and regular rhythm.      Heart sounds: Normal heart sounds.   Pulmonary:      Effort: Pulmonary effort is normal.      Breath sounds: Normal breath sounds.   Musculoskeletal:      Cervical back: Normal range of motion and neck supple.   Neurological:      Mental Status: He is alert.   Psychiatric:         Mood and Affect: Mood normal.         Physical Exam                ED Course     Labs Reviewed   RAPID SARS-COV-2 BY PCR - Normal   POCT FLU TEST - Normal    Narrative:     This assay is a rapid molecular in vitro test utilizing nucleic acid amplification of influenza A and B viral RNA.   RAPID STREP A - Normal          Results                         MDM      50-year-old male with history of diabetes, hypertension, dyslipidemia presents today with complaints of watery eyes, sore throat and headache for 4 days.  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  Patient denies any known COVID or flu exposure.  Patient states it feels like he is standing amongst a bunch of smokers and this is how his sinuses and eyes feel.  Vital signs: Please see EMR.  Physical exam: Please see exam.  Differential diagnosis: COVID, influenza, conjunctivitis, strep throat.  Recent Results (from the past 24 hours)   Rapid SARS-CoV-2 by PCR    Collection Time: 05/07/25  8:40 AM    Specimen: Nares; Other   Result Value Ref Range     Rapid SARS-CoV-2 by PCR Not Detected Not Detected   POCT Flu Test    Collection Time: 05/07/25  8:40 AM    Specimen: Nares; Other   Result Value Ref Range    POCT INFLUENZA A Negative Negative    POCT INFLUENZA B Negative Negative   Rapid Strep A - ID NOW    Collection Time: 05/07/25  8:59 AM    Specimen: Throat; Other   Result Value Ref Range    Strep A by PCR Negative Negative             Medical Decision Making  50-year-old male with history of diabetes, hypertension, dyslipidemia presents today with complaints of watery eyes, sore throat and headache for 4 days.  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  Patient denies any known COVID or flu exposure.  Patient states it feels like he is standing amongst a bunch of smokers and this is how his sinuses and eyes feel.    Amount and/or Complexity of Data Reviewed  Labs: ordered. Decision-making details documented in ED Course.        Disposition and Plan     Clinical Impression:  1. PND (post-nasal drip)    2. Viral illness    3. Allergic rhinitis, unspecified seasonality, unspecified trigger    4. Acute conjunctivitis of both eyes, unspecified acute conjunctivitis type         Disposition:  Discharge  5/7/2025  9:15 am    Follow-up:  Kyle Herbert MD  [address] [place] [id number]  [phone]    In 1 week            Medications Prescribed:  Discharge Medication List as of 5/7/2025  9:17 AM        START taking these medications    Details   methylPREDNISolone (MEDROL) 4 MG Oral Tablet Therapy Pack Dosepack: take as directed, Normal, Disp-1 each, R-0      polymyxin B-trimethoprim 35606-3.1 UNIT/ML-% Ophthalmic Solution Apply 1 drop to eye Q3H While Awake for 5 days., Normal, Disp-10 mL, R-0             Supplementary Documentation:

## (undated) NOTE — LETTER
Date & Time: 5/26/2022, 8:53 AM  Patient: Rigoberto Vincent  Encounter Provider(s):    Jason Centeno MD       To Whom It May Concern:    Heather Salazar was seen and treated in our department on 5/26/2022. He should not return to work until MAY 30, 2022.     If you have any questions or concerns, please do not hesitate to call.        _____________________________  Physician/APC Signature

## (undated) NOTE — LETTER
Date & Time: 5/7/2025, 9:15 AM  Patient: Rodrigo A Reyes  Encounter Provider(s):    Cinthia Flowers APRN       To Whom It May Concern:    Rodrigo Reyes was seen and treated in our department on 5/7/2025. He can return to work 5/8/25.    If you have any questions or concerns, please do not hesitate to call.        _____________________________  Physician/APC Signature

## (undated) NOTE — LETTER
Date & Time: 1/17/2019, 9:12 AM  Patient: Carlota Zavala  Encounter Provider(s):    Abbie Heredia, [address]       To Whom It May Concern:    Asha Falk was seen and treated in our department on 1/17/2019.  He should not return to work until without a feve